# Patient Record
Sex: FEMALE | Race: WHITE | URBAN - METROPOLITAN AREA
[De-identification: names, ages, dates, MRNs, and addresses within clinical notes are randomized per-mention and may not be internally consistent; named-entity substitution may affect disease eponyms.]

---

## 2018-11-05 ENCOUNTER — TELEPHONE (OUTPATIENT)
Dept: OBGYN | Facility: CLINIC | Age: 23
End: 2018-11-05

## 2018-11-05 NOTE — TELEPHONE ENCOUNTER
11/5/2018    Call Regarding ReattributionPhysical    Attempt 1    Message on voicemail     Comments:       Outreach   NADIRA

## 2018-12-13 NOTE — TELEPHONE ENCOUNTER
12/13/2018    Call Regarding ReattributionPhysical    Attempt 2    Message on voicemail     Comments:       Outreach   NADIRA

## 2018-12-20 NOTE — TELEPHONE ENCOUNTER
12/20/2018    Call Regarding ReattributionPhysical    Attempt 3    Message on voicemail     Comments:       Outreach   NADIRA